# Patient Record
Sex: FEMALE | Race: WHITE | HISPANIC OR LATINO | Employment: STUDENT | URBAN - METROPOLITAN AREA
[De-identification: names, ages, dates, MRNs, and addresses within clinical notes are randomized per-mention and may not be internally consistent; named-entity substitution may affect disease eponyms.]

---
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
## 2024-02-22 ENCOUNTER — OFFICE VISIT (OUTPATIENT)
Dept: URGENT CARE | Facility: CLINIC | Age: 19
End: 2024-02-22
Payer: COMMERCIAL

## 2024-02-22 ENCOUNTER — APPOINTMENT (OUTPATIENT)
Dept: LAB | Facility: CLINIC | Age: 19
End: 2024-02-22
Payer: COMMERCIAL

## 2024-02-22 ENCOUNTER — APPOINTMENT (OUTPATIENT)
Dept: URGENT CARE | Facility: CLINIC | Age: 19
End: 2024-02-22
Payer: COMMERCIAL

## 2024-02-22 VITALS
RESPIRATION RATE: 18 BRPM | TEMPERATURE: 98.1 F | OXYGEN SATURATION: 95 % | DIASTOLIC BLOOD PRESSURE: 85 MMHG | SYSTOLIC BLOOD PRESSURE: 127 MMHG | HEART RATE: 96 BPM

## 2024-02-22 DIAGNOSIS — R79.89 ABNORMAL LFTS: ICD-10-CM

## 2024-02-22 DIAGNOSIS — J02.9 SORE THROAT: Primary | ICD-10-CM

## 2024-02-22 DIAGNOSIS — J20.9 ACUTE BRONCHITIS, UNSPECIFIED ORGANISM: ICD-10-CM

## 2024-02-22 LAB
ALBUMIN SERPL BCP-MCNC: 4.4 G/DL (ref 3.5–5)
ALP SERPL-CCNC: 61 U/L (ref 34–104)
ALT SERPL W P-5'-P-CCNC: 28 U/L (ref 7–52)
ANION GAP SERPL CALCULATED.3IONS-SCNC: 8 MMOL/L
AST SERPL W P-5'-P-CCNC: 15 U/L (ref 13–39)
BILIRUB SERPL-MCNC: 1.05 MG/DL (ref 0.2–1)
BUN SERPL-MCNC: 9 MG/DL (ref 5–25)
CALCIUM SERPL-MCNC: 9.2 MG/DL (ref 8.4–10.2)
CHLORIDE SERPL-SCNC: 105 MMOL/L (ref 96–108)
CO2 SERPL-SCNC: 27 MMOL/L (ref 21–32)
CREAT SERPL-MCNC: 0.56 MG/DL (ref 0.6–1.3)
GFR SERPL CREATININE-BSD FRML MDRD: 134 ML/MIN/1.73SQ M
GLUCOSE P FAST SERPL-MCNC: 100 MG/DL (ref 65–99)
POTASSIUM SERPL-SCNC: 3.9 MMOL/L (ref 3.5–5.3)
PROT SERPL-MCNC: 6.5 G/DL (ref 6.4–8.4)
S PYO AG THROAT QL: NEGATIVE
SODIUM SERPL-SCNC: 140 MMOL/L (ref 135–147)

## 2024-02-22 PROCEDURE — 80053 COMPREHEN METABOLIC PANEL: CPT

## 2024-02-22 PROCEDURE — 99213 OFFICE O/P EST LOW 20 MIN: CPT

## 2024-02-22 PROCEDURE — 87880 STREP A ASSAY W/OPTIC: CPT

## 2024-02-22 PROCEDURE — 36415 COLL VENOUS BLD VENIPUNCTURE: CPT

## 2024-02-22 RX ORDER — AZITHROMYCIN 250 MG/1
TABLET, FILM COATED ORAL
Qty: 6 TABLET | Refills: 0 | Status: SHIPPED | OUTPATIENT
Start: 2024-02-22 | End: 2024-02-26

## 2024-02-22 NOTE — PROGRESS NOTES
[facility]        NAME: Samantha Cruz is a 19 y.o. female  : 2005    MRN: [id number]  DATE: 2024  TIME: 10:35 AM    Assessment and Plan   Sore throat [J02.9]  1. Sore throat  POCT rapid ANTIGEN strepA      2. Acute bronchitis, unspecified organism  azithromycin (ZITHROMAX) 250 mg tablet        Rapid strep negative.  Work note given.     Patient Instructions     Start azithromycin.  Mucinex over-the-counter for cough and congestion.  Tylenol and Motrin over-the-counter for pain and fever.    Follow up with PCP.  Go to the nearest emergency department if you have difficulty breathing, worsening symptoms.     Chief Complaint     Chief Complaint   Patient presents with    Sore Throat     Sore throat, runny nose, nausea, and hot flashes. Started 7 days ago. Taking otc medication w/o relief.          History of Present Illness       The patient presents today with complaints of nasal congestion, body aches, nausea, productive cough, sore throat x 7 days. She has been taking OTC dayquil/nyquil with minimal relief.         Review of Systems   Review of Systems   Constitutional:  Positive for diaphoresis. Negative for chills and fever.   HENT:  Positive for congestion, postnasal drip, rhinorrhea and sore throat. Negative for ear pain, sinus pressure and sinus pain.    Respiratory:  Positive for cough. Negative for shortness of breath and wheezing.    Gastrointestinal:  Positive for nausea. Negative for abdominal pain, diarrhea and vomiting.   Musculoskeletal:  Negative for myalgias.   Skin:  Negative for rash.         Current Medications       Current Outpatient Medications:     azithromycin (ZITHROMAX) 250 mg tablet, Take 2 tablets today then 1 tablet daily x 4 days, Disp: 6 tablet, Rfl: 0    norethindrone-ethinyl estradiol (Loestrin Fe ) 1-20 MG-MCG per tablet, Take 1 tablet by mouth daily, Disp: 30 tablet, Rfl: 0    Current Allergies     Allergies as of 2024    (No Known  Allergies)            The following portions of the patient's history were reviewed and updated as appropriate: allergies, current medications, past family history, past medical history, past social history, past surgical history and problem list.     Past Medical History:   Diagnosis Date    Acne        Past Surgical History:   Procedure Laterality Date    KNEE SURGERY Left        Family History   Problem Relation Age of Onset    Ulcerative colitis Mother     Hypertension Father     Heart failure Father          Medications have been verified.        Objective   /85   Pulse 96   Temp 98.1 °F (36.7 °C)   Resp 18   SpO2 95%        Physical Exam     Physical Exam  Vitals and nursing note reviewed.   Constitutional:       General: She is not in acute distress.     Appearance: Normal appearance. She is not ill-appearing.   HENT:      Head: Normocephalic and atraumatic.      Right Ear: Tympanic membrane, ear canal and external ear normal.      Left Ear: Tympanic membrane, ear canal and external ear normal.      Nose: Congestion present. No rhinorrhea.      Mouth/Throat:      Lips: Pink.      Mouth: Mucous membranes are moist.      Pharynx: Oropharynx is clear. Uvula midline. No pharyngeal swelling, oropharyngeal exudate, posterior oropharyngeal erythema or uvula swelling.      Tonsils: No tonsillar exudate.   Eyes:      General: Vision grossly intact.      Extraocular Movements: Extraocular movements intact.      Pupils: Pupils are equal, round, and reactive to light.   Cardiovascular:      Rate and Rhythm: Normal rate and regular rhythm.      Heart sounds: Normal heart sounds. No murmur heard.  Pulmonary:      Effort: Pulmonary effort is normal. No respiratory distress.      Breath sounds: Normal breath sounds. No decreased air movement. No decreased breath sounds, wheezing, rhonchi or rales.   Musculoskeletal:         General: Normal range of motion.      Cervical back: Normal range of motion.    Lymphadenopathy:      Cervical: No cervical adenopathy.   Skin:     General: Skin is warm.      Findings: No rash.   Neurological:      Mental Status: She is alert and oriented to person, place, and time.      Motor: Motor function is intact.      Gait: Gait is intact.   Psychiatric:         Attention and Perception: Attention normal.         Mood and Affect: Mood normal.

## 2024-02-22 NOTE — PATIENT INSTRUCTIONS
Start azithromycin.  Mucinex over-the-counter for cough and congestion.  Tylenol and Motrin over-the-counter for pain and fever.    Follow up with PCP.  Go to the nearest emergency department if you have difficulty breathing, worsening symptoms.         Acute Bronchitis   WHAT YOU NEED TO KNOW:   Acute bronchitis is swelling and irritation in your lungs. It is usually caused by a virus and most often happens in the winter. Bronchitis may also be caused by bacteria or by a chemical irritant, such as smoke.  DISCHARGE INSTRUCTIONS:   Return to the emergency department if:   You cough up blood.     Your lips or fingernails turn blue.     You feel like you are not getting enough air when you breathe.     Call your doctor if:   Your symptoms do not go away or get worse, even after treatment.     Your cough does not get better within 4 weeks.     You have questions or concerns about your condition or care.     Medicines:  You may  need any of the following:  Cough suppressants  decrease your urge to cough.      Decongestants  help loosen mucus in your lungs and make it easier to cough up. This can help you breathe easier.     Inhalers  may be given. Your healthcare provider may give you one or more inhalers to help you breathe easier and cough less. An inhaler gives your medicine to open your airways. Ask your healthcare provider to show you how to use your inhaler correctly.          Antibiotics  may be given for up to 5 days if your bronchitis is caused by bacteria.     Acetaminophen  decreases pain and fever. It is available without a doctor's order. Ask how much to take and how often to take it. Follow directions. Read the labels of all other medicines you are using to see if they also contain acetaminophen, or ask your doctor or pharmacist. Acetaminophen can cause liver damage if not taken correctly. Do not use more than 4 grams (4,000 milligrams) total of acetaminophen in one day.      NSAIDs  help decrease swelling  and pain or fever. This medicine is available with or without a doctor's order. NSAIDs can cause stomach bleeding or kidney problems in certain people. If you take blood thinner medicine, always ask your healthcare provider if NSAIDs are safe for you. Always read the medicine label and follow directions.     Take your medicine as directed.  Contact your healthcare provider if you think your medicine is not helping or if you have side effects. Tell him of her if you are allergic to any medicine. Keep a list of the medicines, vitamins, and herbs you take. Include the amounts, and when and why you take them. Bring the list or the pill bottles to follow-up visits. Carry your medicine list with you in case of an emergency.     Self-care:   Drink liquids as directed.  You may need to drink more liquids than usual to stay hydrated. Ask how much liquid to drink each day and which liquids are best for you.     Use a cool mist humidifier  to increase air moisture in your home. This may make it easier for you to breathe and help decrease your cough.      Get more rest.  Rest helps your body to heal. Slowly start to do more each day. Rest when you feel it is needed.     Avoid irritants in the air.  Avoid chemicals, fumes, and dust. Wear a face mask if you must work around dust or fumes. Stay inside on days when air pollution levels are high. If you have allergies, stay inside when pollen counts are high. Do not use aerosol products, such as spray-on deodorant, bug spray, and hair spray.     Do not smoke or be around others who are smoking.  Nicotine and other chemicals in cigarettes and cigars can cause lung damage. Ask your healthcare provider for information if you currently smoke and need help to quit. E-cigarettes or smokeless tobacco still contain nicotine. Talk to your healthcare provider before you use these products.     Prevent acute bronchitis:       Ask about vaccines you may need.  Get a flu vaccine each year as  soon as recommended, usually in September or October. Ask your healthcare provider if you should also get a pneumonia or COVID-19 vaccine. Your healthcare provider can tell you if you should also get other vaccines, and when to get them.     Prevent the spread of germs.  You can decrease your risk for acute bronchitis and other illnesses by doing the following:      Wash your hands often with soap and water. Carry germ-killing hand lotion or gel with you. You can use the lotion or gel to clean your hands when soap and water are not available.          Do not touch your eyes, nose, or mouth unless you have washed your hands first.     Always cover your mouth when you cough to prevent the spread of germs. It is best to cough into a tissue or your shirt sleeve instead of into your hand. Ask those around you to cover their mouths when they cough.     Try to avoid people who have a cold or the flu. If you are sick, stay away from others as much as possible.     Follow up with your doctor as directed:  Write down questions you have so you will remember to ask them during your follow-up visits.  © Copyright [employer] 2021 Information is for End User's use only and may not be sold, redistributed or otherwise used for commercial purposes. All illustrations and images included in CareNotes® are the copyrighted property of [employer] or [employer]  The above information is an  only. It is not intended as medical advice for individual conditions or treatments. Talk to your doctor, nurse or pharmacist before following any medical regimen to see if it is safe and effective for you.

## 2024-02-22 NOTE — LETTER
February 22, 2024     Patient: Samantha Cruz   YOB: 2005   Date of Visit: 2/22/2024       To Whom it May Concern:    Samantha Cruz was seen in my clinic on 2/22/2024. She may return to work on 2/26/2024 .    If you have any questions or concerns, please don't hesitate to call.         Sincerely,          LARISSA Javier        CC: No Recipients

## 2024-05-26 ENCOUNTER — LAB REQUISITION (OUTPATIENT)
Dept: LAB | Facility: HOSPITAL | Age: 19
End: 2024-05-26

## 2024-05-26 DIAGNOSIS — Z02.83 ENCOUNTER FOR BLOOD-ALCOHOL AND BLOOD-DRUG TEST: ICD-10-CM

## 2024-06-13 ENCOUNTER — APPOINTMENT (OUTPATIENT)
Dept: RADIOLOGY | Facility: CLINIC | Age: 19
End: 2024-06-13
Payer: COMMERCIAL

## 2024-06-13 ENCOUNTER — OFFICE VISIT (OUTPATIENT)
Dept: FAMILY MEDICINE CLINIC | Facility: CLINIC | Age: 19
End: 2024-06-13
Payer: COMMERCIAL

## 2024-06-13 VITALS
SYSTOLIC BLOOD PRESSURE: 108 MMHG | HEIGHT: 65 IN | TEMPERATURE: 98.5 F | DIASTOLIC BLOOD PRESSURE: 74 MMHG | WEIGHT: 209 LBS | BODY MASS INDEX: 34.82 KG/M2 | OXYGEN SATURATION: 98 % | HEART RATE: 80 BPM

## 2024-06-13 DIAGNOSIS — M54.50 LUMBAR PAIN: ICD-10-CM

## 2024-06-13 DIAGNOSIS — M25.562 ACUTE PAIN OF LEFT KNEE: ICD-10-CM

## 2024-06-13 DIAGNOSIS — Z23 ENCOUNTER FOR IMMUNIZATION: ICD-10-CM

## 2024-06-13 DIAGNOSIS — E61.1 IRON DEFICIENCY: ICD-10-CM

## 2024-06-13 DIAGNOSIS — R10.9 LEFT FLANK PAIN: ICD-10-CM

## 2024-06-13 DIAGNOSIS — Z13.220 LIPID SCREENING: ICD-10-CM

## 2024-06-13 DIAGNOSIS — M25.562 ACUTE PAIN OF LEFT KNEE: Primary | ICD-10-CM

## 2024-06-13 DIAGNOSIS — Z00.00 ROUTINE PHYSICAL EXAMINATION: ICD-10-CM

## 2024-06-13 DIAGNOSIS — E55.9 VITAMIN D DEFICIENCY: ICD-10-CM

## 2024-06-13 DIAGNOSIS — Z13.29 SCREENING FOR THYROID DISORDER: ICD-10-CM

## 2024-06-13 PROCEDURE — 90471 IMMUNIZATION ADMIN: CPT

## 2024-06-13 PROCEDURE — 72110 X-RAY EXAM L-2 SPINE 4/>VWS: CPT

## 2024-06-13 PROCEDURE — 90715 TDAP VACCINE 7 YRS/> IM: CPT

## 2024-06-13 PROCEDURE — 99213 OFFICE O/P EST LOW 20 MIN: CPT

## 2024-06-13 PROCEDURE — 73562 X-RAY EXAM OF KNEE 3: CPT

## 2024-06-13 PROCEDURE — 99395 PREV VISIT EST AGE 18-39: CPT

## 2024-06-13 NOTE — PROGRESS NOTES
Adult Annual Physical  Name: Samantha Cruz      : 2005      MRN: [id number]  Encounter Provider: Rochelle Hines PA-C  Encounter Date: 2024   Encounter department: [facility]    Assessment & Plan   1. Acute pain of left knee  Assessment & Plan:  - injury to left knee when walking a few days ago  - exam unremarkable; no pain to palpation, no swelling and full ROM  - ordered XR left knee for further evaluation   - advised supportive care and referral to ortho placed given her hx   Orders:  -     XR knee 3 vw left non injury; Future; Expected date: 2024  -     Ambulatory Referral to Orthopedic Surgery; Future  2. Lumbar pain  Assessment & Plan:  - reports left flank pain radiating to lower back  - ordered XR lumbar spine for further evaluation   - encouraged supportive care   Orders:  -     XR spine lumbar minimum 4 views non injury; Future; Expected date: 2024  3. Left flank pain  Assessment & Plan:  - left flank pain x 2-3 weeks intermittently; no urinary symptoms  - unable to provide urine sample today therefore ordered to be completed at the lab tomorrow  - exam unremarkable; negative for CVA tenderness and pain not reproducible   - advised ER if symptoms worsen   Orders:  -     UA w Reflex to Microscopic w Reflex to Culture; Future  4. Lipid screening  -     Lipid panel; Future  5. Routine physical examination  Assessment & Plan:  - physical exam unremarkable; BP WNL  - ordered routine labs  - updated TDAP  Orders:  -     Comprehensive metabolic panel; Future  6. Iron deficiency  -     Iron Panel (Includes Ferritin, Iron Sat%, Iron, and TIBC); Future  -     CBC and differential; Future  7. Vitamin D deficiency  -     Vitamin D 25 hydroxy; Future  8. Screening for thyroid disorder  -     TSH, 3rd generation with Free T4 reflex; Future  9. Encounter for immunization  -     TDAP VACCINE GREATER THAN OR EQUAL TO 6YO IM    Immunizations and preventive care screenings were  "discussed with patient today. Appropriate education was printed on patient's after visit summary.    Counseling:  Exercise: the importance of regular exercise/physical activity was discussed. Recommend exercise 3-5 times per week for at least 30 minutes.        History of Present Illness       Patient reports a few days ago she was walking and feels like she moved her left knee wrong; ever since has had intermittent pain in the left knee. Patient has chronic issues with this knee and has had surgery on it before so just wants to make sure it is okay. Has not noticed any swelling.   Patient also reports left lower back pain that comes on intermittently and feels \"sharp\". Has been going on for about 2-3 weeks. Does not think she'd be able to give a urine sample today. No urinary symptoms.     Adult Annual Physical:  Patient presents for annual physical.     Diet and Physical Activity:  - Diet/Nutrition: well balanced diet.  - Exercise: no formal exercise.    Depression Screening:  - PHQ-2 Score: 0    General Health:  - Sleep: sleeps well.  - Hearing: normal hearing bilateral ears.  - Vision: no vision problems.  - Dental: regular dental visits and brushes teeth twice daily.    /GYN Health:  - Follows with GYN: yes.   - Menopause: premenopausal.   - Contraception: injectable contraception.      Review of Systems   Constitutional:  Negative for chills, diaphoresis and fever.   Eyes:  Negative for visual disturbance.   Respiratory:  Negative for cough, chest tightness, shortness of breath and wheezing.    Cardiovascular:  Negative for chest pain, palpitations and leg swelling.   Gastrointestinal:  Negative for abdominal pain, blood in stool, constipation, diarrhea, nausea and vomiting.   Genitourinary:  Positive for flank pain (left). Negative for difficulty urinating, dysuria, frequency, hematuria and urgency.   Musculoskeletal:  Positive for arthralgias (left knee) and back pain (lower back). Negative for gait " "problem and joint swelling.   Neurological:  Negative for dizziness, light-headedness and headaches.   Psychiatric/Behavioral:  Negative for sleep disturbance.      Medical History Reviewed by provider this encounter:       Past Medical History   Past Medical History:   Diagnosis Date    Acne      Past Surgical History:   Procedure Laterality Date    KNEE SURGERY Left      Family History   Problem Relation Age of Onset    Ulcerative colitis Mother     Hypertension Father     Heart failure Father      Current Outpatient Medications on File Prior to Visit   Medication Sig Dispense Refill    Etonogestrel (NEXPLANON SC) Inject under the skin      [DISCONTINUED] norethindrone-ethinyl estradiol (Loestrin Fe 1/20) 1-20 MG-MCG per tablet Take 1 tablet by mouth daily 30 tablet 0     No current facility-administered medications on file prior to visit.   No Known Allergies   Current Outpatient Medications on File Prior to Visit   Medication Sig Dispense Refill    Etonogestrel (NEXPLANON SC) Inject under the skin      [DISCONTINUED] norethindrone-ethinyl estradiol (Loestrin Fe 1/20) 1-20 MG-MCG per tablet Take 1 tablet by mouth daily 30 tablet 0     No current facility-administered medications on file prior to visit.      Social History     Tobacco Use    Smoking status: Never    Smokeless tobacco: Never   Vaping Use    Vaping status: Never Used   Substance and Sexual Activity    Alcohol use: Yes    Drug use: Never    Sexual activity: Never     Objective     /74   Pulse 80   Temp 98.5 °F (36.9 °C)   Ht 5' 5\" (1.651 m)   Wt 94.8 kg (209 lb)   SpO2 98%   BMI 34.78 kg/m²     Physical Exam  Vitals reviewed.   Constitutional:       General: She is not in acute distress.     Appearance: Normal appearance. She is not ill-appearing or diaphoretic.   HENT:      Head: Normocephalic and atraumatic.      Right Ear: Tympanic membrane, ear canal and external ear normal. There is no impacted cerumen.      Left Ear: Tympanic " membrane, ear canal and external ear normal. There is no impacted cerumen.      Nose: Nose normal. No congestion or rhinorrhea.      Mouth/Throat:      Mouth: Mucous membranes are moist.      Pharynx: Oropharynx is clear. No oropharyngeal exudate or posterior oropharyngeal erythema.   Eyes:      General:         Right eye: No discharge.         Left eye: No discharge.      Conjunctiva/sclera: Conjunctivae normal.   Cardiovascular:      Rate and Rhythm: Normal rate and regular rhythm.      Pulses: Normal pulses.      Heart sounds: Normal heart sounds. No murmur heard.  Pulmonary:      Effort: Pulmonary effort is normal. No respiratory distress.      Breath sounds: Normal breath sounds. No wheezing, rhonchi or rales.   Abdominal:      General: Bowel sounds are normal. There is no distension.      Palpations: Abdomen is soft. There is no mass.      Tenderness: There is no abdominal tenderness. There is no right CVA tenderness, left CVA tenderness, guarding or rebound.      Hernia: No hernia is present.   Musculoskeletal:         General: Normal range of motion.      Cervical back: Normal range of motion and neck supple.      Left knee: Normal. No swelling or bony tenderness. Normal range of motion. No tenderness.      Right lower leg: No edema.      Left lower leg: No edema.   Lymphadenopathy:      Cervical: No cervical adenopathy.   Skin:     General: Skin is warm.   Neurological:      General: No focal deficit present.      Mental Status: She is alert.      Gait: Gait normal.   Psychiatric:         Mood and Affect: Mood normal.       Administrative Statements

## 2024-06-13 NOTE — ASSESSMENT & PLAN NOTE
- reports left flank pain radiating to lower back  - ordered XR lumbar spine for further evaluation   - encouraged supportive care

## 2024-06-13 NOTE — ASSESSMENT & PLAN NOTE
- injury to left knee when walking a few days ago  - exam unremarkable; no pain to palpation, no swelling and full ROM  - ordered XR left knee for further evaluation   - advised supportive care and referral to ortho placed given her hx

## 2024-06-13 NOTE — ASSESSMENT & PLAN NOTE
- left flank pain x 2-3 weeks intermittently; no urinary symptoms  - unable to provide urine sample today therefore ordered to be completed at the lab tomorrow  - exam unremarkable; negative for CVA tenderness and pain not reproducible   - advised ER if symptoms worsen

## 2024-06-14 ENCOUNTER — TELEPHONE (OUTPATIENT)
Dept: FAMILY MEDICINE CLINIC | Facility: CLINIC | Age: 19
End: 2024-06-14

## 2024-06-14 NOTE — TELEPHONE ENCOUNTER
Rochelle Hines PA-C  6/14/2024  7:37 AM EDT Back to Top      XR lower back normal   XR left knee normal. Follow with ortho for further eval; referral placed yesterday

## 2024-06-17 NOTE — TELEPHONE ENCOUNTER
Patient returning call regarding results.  Warm transfer to [facility].

## 2024-06-20 LAB
25(OH)D3+25(OH)D2 SERPL-MCNC: 22 NG/ML (ref 30–100)
ALBUMIN SERPL-MCNC: 4.4 G/DL (ref 3.5–5.7)
ALP SERPL-CCNC: 71 U/L (ref 35–120)
ALT SERPL-CCNC: 15 U/L
ANION GAP SERPL CALCULATED.3IONS-SCNC: 9 MMOL/L (ref 3–11)
AST SERPL-CCNC: 14 U/L
BACTERIA URNS QL MICRO: ABNORMAL
BASOPHILS # BLD AUTO: 0.1 THOU/CMM (ref 0–0.1)
BASOPHILS NFR BLD AUTO: 1 %
BILIRUB SERPL-MCNC: 0.9 MG/DL (ref 0.2–1)
BUN SERPL-MCNC: 8 MG/DL (ref 7–25)
CALCIUM SERPL-MCNC: 9.2 MG/DL (ref 8.5–10.1)
CHLORIDE SERPL-SCNC: 105 MMOL/L (ref 100–109)
CHOLEST SERPL-MCNC: 170 MG/DL
CHOLEST/HDLC SERPL: 5.5 {RATIO}
CO2 SERPL-SCNC: 25 MMOL/L (ref 21–31)
CREAT SERPL-MCNC: 0.65 MG/DL (ref 0.4–1.1)
CYTOLOGY CMNT CVX/VAG CYTO-IMP: NORMAL
DIFFERENTIAL METHOD BLD: ABNORMAL
EOSINOPHIL # BLD AUTO: 0.2 THOU/CMM (ref 0–0.5)
EOSINOPHIL NFR BLD AUTO: 4 %
ERYTHROCYTE [DISTWIDTH] IN BLOOD BY AUTOMATED COUNT: 12.8 % (ref 12–16)
FERRITIN SERPL-MCNC: 23 NG/ML (ref 11–306.8)
GFR/BSA.PRED SERPLBLD CYS-BASED-ARV: 129 ML/MIN/{1.73_M2}
GLUCOSE SERPL-MCNC: 86 MG/DL (ref 65–99)
GLUCOSE UR QL STRIP: NEGATIVE MG/DL
HCT VFR BLD AUTO: 42.8 % (ref 35–43)
HDLC SERPL-MCNC: 31 MG/DL (ref 23–92)
HGB BLD-MCNC: 14.6 G/DL (ref 11.5–14.5)
HGB UR QL STRIP: ABNORMAL MG/DL
IRON SATN MFR SERPL: 12 % (ref 20–50)
IRON SERPL-MCNC: 54 UG/DL (ref 50–212)
KETONES UR QL STRIP: NEGATIVE MG/DL
LDLC SERPL CALC-MCNC: 120 MG/DL
LEUKOCYTE ESTERASE UR QL STRIP: ABNORMAL /UL
LYMPHOCYTES # BLD AUTO: 2.9 THOU/CMM (ref 1–3)
LYMPHOCYTES NFR BLD AUTO: 44 %
MCH RBC QN AUTO: 30.1 PG (ref 26–34)
MCHC RBC AUTO-ENTMCNC: 34.1 G/DL (ref 32–37)
MCV RBC AUTO: 88 FL (ref 80–100)
MONOCYTES # BLD AUTO: 0.5 THOU/CMM (ref 0.3–1)
MONOCYTES NFR BLD AUTO: 7 %
MUCOUS THREADS URNS QL MICRO: ABNORMAL
NEUTROPHILS # BLD AUTO: 2.9 THOU/CMM (ref 1.8–7.8)
NEUTROPHILS NFR BLD AUTO: 44 %
NITRITE UR QL STRIP: NEGATIVE
NONHDLC SERPL-MCNC: 139 MG/DL
PH UR: 6 [PH] (ref 4.5–8)
PLATELET # BLD AUTO: 228 THOU/CMM (ref 140–350)
PMV BLD REES-ECKER: 8.5 FL (ref 7.5–11.3)
POTASSIUM SERPL-SCNC: 4.1 MMOL/L (ref 3.5–5.2)
PROT 24H UR-MRATE: NEGATIVE MG/DL
PROT SERPL-MCNC: 6.9 G/DL (ref 6.3–8.3)
RBC # BLD AUTO: 4.84 MILL/CMM (ref 3.7–4.7)
RBC #/AREA URNS HPF: ABNORMAL /HPF (ref 0–2)
SL AMB POCT URINE COMMENT: ABNORMAL
SODIUM SERPL-SCNC: 139 MMOL/L (ref 135–145)
SP GR UR: 1.02 (ref 1–1.03)
SQUAMOUS #/AREA URNS HPF: >10 /LPF (ref 0–5)
TIBC SERPL-MCNC: 445 UG/DL (ref 260–430)
TRANSFERRIN SERPL-MCNC: 318 MG/DL (ref 203–362)
TRIGL SERPL-MCNC: 95 MG/DL
TSH SERPL-ACNC: 0.83 UIU/ML (ref 0.45–5.33)
WBC # BLD AUTO: 6.6 THOU/CMM (ref 4–10)
WBC #/AREA URNS HPF: ABNORMAL /HPF (ref 0–5)

## 2024-06-21 ENCOUNTER — TELEPHONE (OUTPATIENT)
Dept: FAMILY MEDICINE CLINIC | Facility: CLINIC | Age: 19
End: 2024-06-21

## 2024-06-21 DIAGNOSIS — E61.1 IRON DEFICIENCY: Primary | ICD-10-CM

## 2024-06-21 LAB — LEGIONELLA SPEC CULT: NORMAL

## 2024-06-21 RX ORDER — FERROUS SULFATE 324(65)MG
324 TABLET, DELAYED RELEASE (ENTERIC COATED) ORAL
Qty: 30 TABLET | Refills: 0 | Status: SHIPPED | OUTPATIENT
Start: 2024-06-21

## 2024-06-21 NOTE — TELEPHONE ENCOUNTER
----- Message from Rochelle Hines PA-C sent at 6/21/2024  9:24 AM EDT -----  Her urine did show bacteria in it; is she still having the flank pain/any urinary symptoms? Would recommend antibiotic, keflex if so. Can send in.

## 2024-06-21 NOTE — TELEPHONE ENCOUNTER
Pt is aware of message from 6/21/24 regarding results of labs and urine her symptoms are off and on pain on her left thigh sometimes on the right side doesn't want to be put on antibiotic.pt agree to take vitamin D otc and for ferrous sulfate to be sent to pharmacy.

## 2024-06-21 NOTE — TELEPHONE ENCOUNTER
----- Message from Rochelle Hines PA-C sent at 6/21/2024  9:26 AM EDT -----  Vitamin D low - recommend taking 2000 units daily OTC then will recheck in 12 weeks.   Iron panel shows her iron to be a little low; does she take iron supplement? If not, would recommend. Can send in ferrous sulfate.   Hgb and RBC a little high; could be related to the iron def. WBC are normal.   TSH normal.   Total cholesterol and triglycerides are normal.   Kidney and liver function normal. Sugar normal.

## 2024-06-21 NOTE — TELEPHONE ENCOUNTER
Sent iron - take in morning with breakfast. May cause upset stomach, constipation, diarrhea, discoloration of stool.

## 2024-06-24 ENCOUNTER — TELEPHONE (OUTPATIENT)
Dept: FAMILY MEDICINE CLINIC | Facility: CLINIC | Age: 19
End: 2024-06-24

## 2024-06-24 DIAGNOSIS — E55.9 VITAMIN D DEFICIENCY: Primary | ICD-10-CM

## 2024-06-24 RX ORDER — ERGOCALCIFEROL 1.25 MG/1
50000 CAPSULE ORAL WEEKLY
Qty: 12 CAPSULE | Refills: 0 | Status: SHIPPED | OUTPATIENT
Start: 2024-06-24

## 2024-06-24 NOTE — TELEPHONE ENCOUNTER
Mom called and Samantha isn't taking the calcium regularly. Can you RX Vitamin D 50,000 units one a week instead? To [facility] in [place].

## 2024-07-10 ENCOUNTER — OFFICE VISIT (OUTPATIENT)
Dept: OBGYN CLINIC | Facility: CLINIC | Age: 19
End: 2024-07-10
Payer: COMMERCIAL

## 2024-07-10 VITALS
BODY MASS INDEX: 33.11 KG/M2 | WEIGHT: 206 LBS | HEIGHT: 66 IN | HEART RATE: 84 BPM | DIASTOLIC BLOOD PRESSURE: 78 MMHG | SYSTOLIC BLOOD PRESSURE: 113 MMHG

## 2024-07-10 DIAGNOSIS — R29.898 WEAKNESS OF BOTH HIPS: ICD-10-CM

## 2024-07-10 DIAGNOSIS — M62.9 HAMSTRING TIGHTNESS OF BOTH LOWER EXTREMITIES: ICD-10-CM

## 2024-07-10 DIAGNOSIS — M22.2X2 PATELLOFEMORAL SYNDROME OF LEFT KNEE: Primary | ICD-10-CM

## 2024-07-10 PROCEDURE — 99244 OFF/OP CNSLTJ NEW/EST MOD 40: CPT | Performed by: FAMILY MEDICINE

## 2024-07-10 RX ORDER — NAPROXEN 500 MG/1
500 TABLET ORAL 2 TIMES DAILY WITH MEALS
Qty: 30 TABLET | Refills: 0 | Status: SHIPPED | OUTPATIENT
Start: 2024-07-10

## 2024-07-10 NOTE — PROGRESS NOTES
Assessment/Plan:  Assessment & Plan   Diagnoses and all orders for this visit:    Patellofemoral syndrome of left knee  -     Ambulatory Referral to Orthopedic Surgery  -     Brace  -     naproxen (Naprosyn) 500 mg tablet; Take 1 tablet (500 mg total) by mouth 2 (two) times a day with meals  -     Ambulatory Referral to Physical Therapy; Future    Weakness of both hips  -     Ambulatory Referral to Physical Therapy; Future    Hamstring tightness of both lower extremities  -     Ambulatory Referral to Physical Therapy; Future      19-year-old female with left knee pain and instability more than several years duration.  Discussed the patient physical exam, radiographs, impression, and plan.  X-rays of the left knee are unremarkable for acute osseous abnormality.  Imaging studies, physical exam, symptomology are consistent with patellofemoral syndrome.  I discussed treatment regimen of anti-inflammatory, supplements, bracing, and formal therapy.  Invasive management such as surgery not warranted at this time.  She is to take naproxen 500 mg twice daily with food for 2 weeks.  She is to take turmeric, tart cherry, and glucosamine supplements.  I provided patellar stabilizing brace to wear during physical activity.  I will refer her to formal therapy which she is start a soon as possible and do home exercises as directed.  Recommend she visit athletic shoe store to be advised on supportive footwear.  She will follow-up as needed.      Subjective:   Patient ID: Samantha Cruz is a 19 y.o. female.  Chief Complaint   Patient presents with    Left Knee - Pain        19-year-old female presents evaluation left knee pain and instability more than several years duration.  She reports have had injury to the left knee when she was much younger which she states warranting arthroscopic surgery for cartilage defect repair.  Since then she has been increasing pain described as generalized and knee but worse to the anterior aspect,  "achy and sore and burning, worse with bending and squatting, worse with kneeling, associate with clicking/popping and symptom instability of the knee, and improved with resting.  She does not usually take medication for symptoms.  She works as a  and spends a lot of time bending and squatting which aggravates her pain.  She was seen by primary care provider, referred for x-rays, and referred to orthopedic care.    Knee Pain  This is a chronic problem. The current episode started more than 1 year ago. The problem occurs daily. The problem has been gradually worsening. Associated symptoms include arthralgias and joint swelling. Pertinent negatives include no numbness or weakness. The symptoms are aggravated by bending (Kneeling, squatting). She has tried rest and position changes for the symptoms. The treatment provided mild relief.           The following portions of the patient's history were reviewed and updated as appropriate: She  has a past medical history of Acne.  She has No Known Allergies..    Review of Systems   Musculoskeletal:  Positive for arthralgias and joint swelling.   Neurological:  Negative for weakness and numbness.       Objective:  Vitals:    07/10/24 1310   BP: 113/78   Pulse: 84   Weight: 93.4 kg (206 lb)   Height: 5' 6\" (1.676 m)      Left Knee Exam     Muscle Strength   The patient has normal left knee strength.    Tenderness   The patient is experiencing tenderness in the medial joint line (Patella facet).    Range of Motion   Extension:  normal   Flexion:  130     Tests   Luz Marina:  Medial - negative Lateral - negative  Varus: negative Valgus: negative  Lachman:  Anterior - negative        Other   Swelling: none    Comments:  Positive patella inhibition and grind      Right Hip Exam     Muscle Strength   Abduction: 4/5   Flexion: 5/5       Left Hip Exam     Muscle Strength   Abduction: 4/5   Flexion: 4/5     Tests   SYLWIA: negative    Comments:  Negative " FADDIR  Hamstring tightness            Physical Exam  Vitals and nursing note reviewed.   Constitutional:       General: She is not in acute distress.     Appearance: Normal appearance. She is well-developed. She is not ill-appearing or diaphoretic.   HENT:      Head: Normocephalic and atraumatic.      Right Ear: External ear normal.      Left Ear: External ear normal.   Eyes:      Conjunctiva/sclera: Conjunctivae normal.   Neck:      Trachea: No tracheal deviation.   Cardiovascular:      Rate and Rhythm: Normal rate.   Pulmonary:      Effort: Pulmonary effort is normal. No respiratory distress.   Abdominal:      General: There is no distension.   Musculoskeletal:         General: Tenderness present.      Left knee:      Instability Tests: Medial Luz Marina test negative and lateral Luz Marina test negative.   Skin:     General: Skin is warm and dry.      Coloration: Skin is not jaundiced or pale.   Neurological:      Mental Status: She is alert and oriented to person, place, and time.   Psychiatric:         Mood and Affect: Mood normal.         Behavior: Behavior normal.         Thought Content: Thought content normal.         Judgment: Judgment normal.         I have personally reviewed pertinent films in PACS and my interpretation is  .  No acute osseous abnormality left knee.

## 2024-07-10 NOTE — PATIENT INSTRUCTIONS
Over the counter vitamins:    - Turmeric vitamin at least 1000 mg daily    - Tart cherry vitamin at least 1000 mg daily    - Glucosamine-chondrointin 2-3 times daily      Brace during physical activity    [employer] [facility] in [place], [place]    Rx: Naproxen 500 mg  - twice daily  - eat first  - 2 weeks

## 2024-07-10 NOTE — LETTER
July 10, 2024     Rochelle Hines PA-C  [address]   [address] [place] [id number]    Patient: Samantha Cruz   YOB: 2005   Date of Visit: 7/10/2024       Dear Dr. Pepper:    Thank you for referring Samantha Cruz to me for evaluation. Below are my notes for this consultation.    If you have questions, please do not hesitate to call me. I look forward to following your patient along with you.         Sincerely,        Negrito Benitez,         CC: No Recipients    Negrito Benitez DO  7/10/2024  4:28 PM  Sign when Signing Visit  Assessment/Plan:  Assessment & Plan  Diagnoses and all orders for this visit:    Patellofemoral syndrome of left knee  -     Ambulatory Referral to Orthopedic Surgery  -     Brace  -     naproxen (Naprosyn) 500 mg tablet; Take 1 tablet (500 mg total) by mouth 2 (two) times a day with meals  -     Ambulatory Referral to Physical Therapy; Future    Weakness of both hips  -     Ambulatory Referral to Physical Therapy; Future    Hamstring tightness of both lower extremities  -     Ambulatory Referral to Physical Therapy; Future      19-year-old female with left knee pain and instability more than several years duration.  Discussed the patient physical exam, radiographs, impression, and plan.  X-rays of the left knee are unremarkable for acute osseous abnormality.  Imaging studies, physical exam, symptomology are consistent with patellofemoral syndrome.  I discussed treatment regimen of anti-inflammatory, supplements, bracing, and formal therapy.  Invasive management such as surgery not warranted at this time.  She is to take naproxen 500 mg twice daily with food for 2 weeks.  She is to take turmeric, tart cherry, and glucosamine supplements.  I provided patellar stabilizing brace to wear during physical activity.  I will refer her to formal therapy which she is start a soon as possible and do home exercises as directed.  Recommend she visit  "athletic shoe store to be advised on supportive footwear.  She will follow-up as needed.      Subjective:   Patient ID: Samantha Cruz is a 19 y.o. female.  Chief Complaint   Patient presents with   • Left Knee - Pain        19-year-old female presents evaluation left knee pain and instability more than several years duration.  She reports have had injury to the left knee when she was much younger which she states warranting arthroscopic surgery for cartilage defect repair.  Since then she has been increasing pain described as generalized and knee but worse to the anterior aspect, achy and sore and burning, worse with bending and squatting, worse with kneeling, associate with clicking/popping and symptom instability of the knee, and improved with resting.  She does not usually take medication for symptoms.  She works as a  and spends a lot of time bending and squatting which aggravates her pain.  She was seen by primary care provider, referred for x-rays, and referred to orthopedic care.    Knee Pain  This is a chronic problem. The current episode started more than 1 year ago. The problem occurs daily. The problem has been gradually worsening. Associated symptoms include arthralgias and joint swelling. Pertinent negatives include no numbness or weakness. The symptoms are aggravated by bending (Kneeling, squatting). She has tried rest and position changes for the symptoms. The treatment provided mild relief.           The following portions of the patient's history were reviewed and updated as appropriate: She  has a past medical history of Acne.  She has No Known Allergies..    Review of Systems   Musculoskeletal:  Positive for arthralgias and joint swelling.   Neurological:  Negative for weakness and numbness.       Objective:  Vitals:    07/10/24 1310   BP: 113/78   Pulse: 84   Weight: 93.4 kg (206 lb)   Height: 5' 6\" (1.676 m)      Left Knee Exam     Muscle Strength   The patient has normal " left knee strength.    Tenderness   The patient is experiencing tenderness in the medial joint line (Patella facet).    Range of Motion   Extension:  normal   Flexion:  130     Tests   Luz Marina:  Medial - negative Lateral - negative  Varus: negative Valgus: negative  Lachman:  Anterior - negative        Other   Swelling: none    Comments:  Positive patella inhibition and grind      Right Hip Exam     Muscle Strength   Abduction: 4/5   Flexion: 5/5       Left Hip Exam     Muscle Strength   Abduction: 4/5   Flexion: 4/5     Tests   SYLWIA: negative    Comments:  Negative FADDIR  Hamstring tightness            Physical Exam  Vitals and nursing note reviewed.   Constitutional:       General: She is not in acute distress.     Appearance: Normal appearance. She is well-developed. She is not ill-appearing or diaphoretic.   HENT:      Head: Normocephalic and atraumatic.      Right Ear: External ear normal.      Left Ear: External ear normal.   Eyes:      Conjunctiva/sclera: Conjunctivae normal.   Neck:      Trachea: No tracheal deviation.   Cardiovascular:      Rate and Rhythm: Normal rate.   Pulmonary:      Effort: Pulmonary effort is normal. No respiratory distress.   Abdominal:      General: There is no distension.   Musculoskeletal:         General: Tenderness present.      Left knee:      Instability Tests: Medial Luz Marina test negative and lateral Luz Marina test negative.   Skin:     General: Skin is warm and dry.      Coloration: Skin is not jaundiced or pale.   Neurological:      Mental Status: She is alert and oriented to person, place, and time.   Psychiatric:         Mood and Affect: Mood normal.         Behavior: Behavior normal.         Thought Content: Thought content normal.         Judgment: Judgment normal.         I have personally reviewed pertinent films in PACS and my interpretation is  .  No acute osseous abnormality left knee.

## 2024-07-13 PROBLEM — Z00.00 ROUTINE PHYSICAL EXAMINATION: Status: RESOLVED | Noted: 2023-02-20 | Resolved: 2024-07-13

## 2024-07-23 NOTE — PROGRESS NOTES
PT Evaluation     Today's date: 24  Patient name: Samantha Cruz  : 2005  MRN: [id number]  Referring provider: Negrito Benitez*  Dx:   Encounter Diagnosis     ICD-10-CM    1. Patellofemoral syndrome of left knee  M22.2X2 Ambulatory Referral to Physical Therapy      2. Weakness of both hips  R29.898 Ambulatory Referral to Physical Therapy      3. Hamstring tightness of both lower extremities  M62.9 Ambulatory Referral to Physical Therapy          Start Time: 1530  Stop Time: 1630  Total time in clinic (min): 60 minutes     Assessment  Impairments: abnormal coordination, abnormal gait, abnormal muscle firing, abnormal or restricted ROM, abnormal movement, activity intolerance, impaired balance, impaired physical strength, lacks appropriate home exercise program, pain with function, safety issue, weight-bearing intolerance, poor posture  and poor body mechanics    Assessment details: Samantha Cruz is a a pleasant 19 y.o. female who presents today with pain, decreased strength, decreased ROM, and ambulatory dysfunction. The patient's clinical presentation is consistent with her diagnosis of L knee pain. Samantha complains of sharp and throbbing pain in the left knee ranging from 0/10 to 8/10. Pain is exacerbated by activity or standing and made better by heat, medication, or rest.    The patient demonstrates minimally decreased strength during resisted muscle testing. Pt ROM is minimally decreased with no pain.     The patient has difficulty with ambulation, transfers, leisure, athletics, and work. Patient will benefit from skilled physical therapy, including therapeutic exercise, stretching, balance training, manual therapy, and modalities prn to improve their level of function, to increase overall quality of life, and to address her impairments.    Dispensed home exercise program and educated patient on proper technique, frequency, and the possibility of DOMS for the next 24 to 48 hours. Patient  demonstrated understanding and was advised to call us if she has any further questions.  Understanding of Dx/Px/POC: excellent     Prognosis: excellent    Goals  ST. Independent with HEP in 2 weeks.   2. Pt will have verbal report of improvement in symptoms by >/=25% in 2 weeks.   3. Decrease pain to 6/10 at it's worst.   4. Increase strength by 1/2 grade in all deficient planes.     To be achieved by D/C   LT. Pt will improve FOTO score by >/= goal points in 6 weeks.   2. Pt will improve FOTO score to >/= goal score by visit # 12.   3. Pt will be able to stand for an hour with little to no difficulty.   4. Pt will be able to walk a mile with little to no difficulty.   5. Pt will be able to perform her usual activities including bending and lifting with little to no difficulty.     Plan  Patient would benefit from: skilled PT  Planned modality interventions: cryotherapy, TENS and unattended electrical stimulation    Planned therapy interventions: activity modification, ADL retraining, balance, balance/weight bearing training, behavior modification, body mechanics training, functional ROM exercises, gait training, home exercise program, IADL retraining, joint mobilization, manual therapy, massage, neuromuscular re-education, patient education, strengthening, stretching, therapeutic activities, therapeutic exercise, transfer training, kinesiology taping and Fitzgerald taping    Frequency: 2x week  Duration in weeks: 8  Plan of Care beginning date: 2024  Plan of Care expiration date: 2024  Treatment plan discussed with: patient and family        Subjective Evaluation    History of Present Illness  Mechanism of injury: Samantha presents today with complaints of L knee pain that began when she was a child. She gets sharp pains and feels instability. She is wearing a brace on her L knee which has helped. She no longer gets sharp pains, or feel like it's going to give out.    Pt had X-ray performed on   that shows the following: No acute osseous abnormality.    The patient also reports diminished strength, decreased ROM, decreased balance, decreased endurance, and difficulty with function.    Samantha has a past medical history of Acne. The patient also has a past surgical history that includes Knee surgery (Left).  Patient Goals  Patient goals for therapy: return to sport/leisure activities, independence with ADLs/IADLs, increased strength, decreased pain, increased motion and improved balance    Pain  Current pain ratin  At best pain ratin  At worst pain ratin  Quality: dull ache, throbbing and sharp  Relieving factors: ice and support  Aggravating factors: sitting, standing, walking, stair climbing and lifting  Progression: improved (since starting wear brace)          Objective     Tenderness   Left Knee   Tenderness in the patellar tendon.     Neurological Testing     Sensation     Knee   Left Knee   Intact: Light touch    Right Knee   Intact: light touch     Reflexes   Left   Patellar (L4): normal (2+)  Achilles (S1): normal (2+)    Right   Patellar (L4): normal (2+)  Achilles (S1): normal (2+)    Passive Range of Motion   Left Knee   Normal passive range of motion    Right Knee   Normal passive range of motion    Strength/Myotome Testing     Left Knee   Flexion: 4+  Extension: 4+    Right Knee   Normal strength    Tests     Additional Tests Details  All negative special testing at this time.    General Comments:      Ankle/Foot Comments   L dorsal foot bruising from dropping something on it. Denies TTP at this time.         Precautions: Standard    POC expires Unit limit Auth  expiration date PT/OT + Visit Limit?   24 BOMN 24 24                 Visit/Unit Tracking  AUTH Status:  Date               Approved Used 1               Remaining                      Access Code: [email or web address]  URL: [email or web address]  Date: 2024  Prepared by: Gilson Rawls    Manuals              Knee PROM             Patellar Mobs             Knee jt mobs             Spencer tape             Kinesio Tape             Neuro Re-Ed             SL foam catch             Blue foam bal.             StS walk on foam TB             Bosu Balance             SL Clock              Ther Ex             Upright Bike for cardio endurance             Pt Ed. - pathophysiology, HEP, prognosis TS            Bwd. Walk TM             Monster walk             Lateral walk             Lunge w/ lat./med. pull             Side lunges on slider HEP             Matrix HS HEP             Matrix Quad HEP             Resisted walk             TKE HEP             Ther Activity             TG SL             FSU             FSD             LSU             LSD             Gait Training                                       Modalities

## 2024-07-25 ENCOUNTER — EVALUATION (OUTPATIENT)
Dept: PHYSICAL THERAPY | Facility: CLINIC | Age: 19
End: 2024-07-25
Payer: COMMERCIAL

## 2024-07-25 DIAGNOSIS — R29.898 WEAKNESS OF BOTH HIPS: ICD-10-CM

## 2024-07-25 DIAGNOSIS — M22.2X2 PATELLOFEMORAL SYNDROME OF LEFT KNEE: ICD-10-CM

## 2024-07-25 DIAGNOSIS — M62.9 HAMSTRING TIGHTNESS OF BOTH LOWER EXTREMITIES: ICD-10-CM

## 2024-07-25 PROCEDURE — 97161 PT EVAL LOW COMPLEX 20 MIN: CPT

## 2024-07-25 PROCEDURE — 97110 THERAPEUTIC EXERCISES: CPT

## 2024-07-30 DIAGNOSIS — E61.1 IRON DEFICIENCY: ICD-10-CM

## 2024-07-31 RX ORDER — FERROUS SULFATE 324(65)MG
324 TABLET, DELAYED RELEASE (ENTERIC COATED) ORAL
Qty: 30 TABLET | Refills: 5 | Status: SHIPPED | OUTPATIENT
Start: 2024-07-31

## 2024-10-23 ENCOUNTER — TELEPHONE (OUTPATIENT)
Age: 19
End: 2024-10-23

## 2024-10-23 NOTE — TELEPHONE ENCOUNTER
Mother Requested a list of immunization records to be picked up today 10/23 by patient.     Please advise  Thank you

## 2024-10-25 ENCOUNTER — NURSE TRIAGE (OUTPATIENT)
Age: 19
End: 2024-10-25

## 2024-10-25 NOTE — TELEPHONE ENCOUNTER
"Patient called in requesting to schedule her nexplanon removal. States it has been bothering hre for the past few months. Sometimes feels a pain and pinching. States she just feels it in her arm and she does not like that so wants it removed. Attempted to schedule removal however no availability. Called over to the office however no answer. Will send clerical a message for outreach and scheduling.    Reason for Disposition   Wants to get implant removed    Answer Assessment - Initial Assessment Questions  1. IMPLANT TYPE: \"What type of implant are you using?\"  (e.g., Implanon, Nexplanon, Norplant, Jadelle)       Nexplanon  2. IMPLANT START DATE: \"When did you first start using the implant?\"      04/2023  3. IMPLANT LOCATION: \"Where is implant located?\"       L Arm  4. SYMPTOM: \"What is the main symptom (or question) you're concerned about?\"      Bothering, uncomfortable. Feels pinching, feels it in her arm.   5. ONSET: \"When did the symptoms start?\"      Past few months   6. VAGINAL BLEEDING: \"Are you having any unusual vaginal bleeding?\"      N/A  7. PAIN: \"Is there any pain?\" (Scale: 1-10; mild, moderate, severe).      occasional  8. PREGNANCY: \"Are you concerned you might be pregnant?\" \"Are you having any symptoms of pregnancy (breast tenderness, nausea, etc)?\"       Unsure, last week   9.  STD (STI): \"Are you concerned about the possibility of a STD (STI)?\" \"Are you having unprotected sex (sex without a condom)?\"  Denies    Protocols used: Contraception - Implant-Pediatric-[place]    "

## 2024-11-06 ENCOUNTER — TELEPHONE (OUTPATIENT)
Age: 19
End: 2024-11-06

## 2024-11-06 NOTE — TELEPHONE ENCOUNTER
Patient missed appt for today 11/06/2024 for a Nexplanon removal, warm transferred to office, spoke with states Deepika to schedule next available and send telephone encounter to clerical. Patient is scheduled for next available 01/16/2025 with Paty Tran and added to the wait list. Please call patient if she can be seen sooner for Nexplanon removal.

## 2025-01-16 ENCOUNTER — PROCEDURE VISIT (OUTPATIENT)
Dept: OBGYN CLINIC | Facility: MEDICAL CENTER | Age: 20
End: 2025-01-16
Payer: COMMERCIAL

## 2025-01-16 VITALS
WEIGHT: 196 LBS | DIASTOLIC BLOOD PRESSURE: 74 MMHG | BODY MASS INDEX: 31.5 KG/M2 | HEIGHT: 66 IN | SYSTOLIC BLOOD PRESSURE: 124 MMHG

## 2025-01-16 DIAGNOSIS — Z30.46 NEXPLANON REMOVAL: Primary | ICD-10-CM

## 2025-01-16 PROCEDURE — 11982 REMOVE DRUG IMPLANT DEVICE: CPT | Performed by: CLINICAL NURSE SPECIALIST

## 2025-01-16 NOTE — PATIENT INSTRUCTIONS
POST NEXPLANON REMOVAL    You will likely feel mild discomfort at the site as the local anesthesia wears off.  Please keep site wrapped x 24 hours, then keep clean and dry.  Ok to use bandaid if desired.   Steristrips should fall off on their own within 1-2 weeks.  Use ice/motrin/tylenol for any discomfort.  Please notify us if fever, pain or drainage occurs that is concerning to you

## 2025-01-16 NOTE — PROGRESS NOTES
"NEXPLANON REMOVAL    Date of Insertion:  4/4/2023  Date of Removal:  January 16, 2025    Information related to removal of the implant:   Reason(s) for removal:  Other side effects: Pain and pinching  Was implant palpable before removal?  Yes    Procedure:      Universal Protocol:  Procedure performed by:  Consent: Verbal consent obtained.  Risks and benefits: risks, benefits and alternatives were discussed  Consent given by: patient  Time out: Immediately prior to procedure a \"time out\" was called to verify the correct patient, procedure, equipment, support staff and site/side marked as required.  Timeout called at: 1/16/2025 3:06 PM.  Patient understanding: patient states understanding of the procedure being performed  Site marked: the operative site was marked (left arm)  Radiology Images displayed and confirmed. If images not available, report reviewed: imaging studies available  Required items: required blood products, implants, devices, and special equipment available  Patient identity confirmed: verbally with patient  Remove and insert drug implant    Date/Time: 1/16/2025 3:30 PM    Performed by: LARISSA Sutton  Authorized by: LARISSA Sutton    Indication:     Indication: Presence of non-biodegradable drug delivery implant    Pre-procedure:     Pre-procedure timeout performed: yes      Local anesthetic:  Xylocaine with epinephrine    The site was cleaned and prepped in a sterile fashion: yes    Procedure:     Procedure:  Removal    Small stab incision was made in arm: yes (pop out technique used for easy removal)      Left/right:  Left    Site was closed with steri-strips and pressure bandage applied: yes    Comments:       The implant was inspected and found to be intact and complete.  Steri strips and a pressure dressing were applied to the site.  After removal instructions were given and verbally reviewed with the patient who acknowledged her understanding.    Difficulties with the implant " removal procedure?  no

## 2025-05-08 ENCOUNTER — TELEPHONE (OUTPATIENT)
Age: 20
End: 2025-05-08

## 2025-05-08 DIAGNOSIS — Z00.00 HEALTHCARE MAINTENANCE: ICD-10-CM

## 2025-05-08 DIAGNOSIS — Z13.220 LIPID SCREENING: ICD-10-CM

## 2025-05-08 DIAGNOSIS — E61.1 IRON DEFICIENCY: Primary | ICD-10-CM

## 2025-05-08 DIAGNOSIS — E55.9 VITAMIN D DEFICIENCY: ICD-10-CM

## 2025-05-08 NOTE — TELEPHONE ENCOUNTER
Patient has a physical coming up on June 16h w/ Rochelle Hines. Patient would like to know if labs can be ordered to get done before appointment? Please advise. Thank you!     VETOJUANJO (Mother)[phone] (Home Phone)

## 2025-06-16 ENCOUNTER — OFFICE VISIT (OUTPATIENT)
Dept: FAMILY MEDICINE CLINIC | Facility: CLINIC | Age: 20
End: 2025-06-16
Payer: COMMERCIAL

## 2025-06-16 VITALS
HEART RATE: 96 BPM | SYSTOLIC BLOOD PRESSURE: 110 MMHG | WEIGHT: 202 LBS | BODY MASS INDEX: 32.47 KG/M2 | OXYGEN SATURATION: 98 % | DIASTOLIC BLOOD PRESSURE: 70 MMHG | HEIGHT: 66 IN | TEMPERATURE: 97.8 F

## 2025-06-16 DIAGNOSIS — M22.2X2 PATELLOFEMORAL SYNDROME OF LEFT KNEE: ICD-10-CM

## 2025-06-16 DIAGNOSIS — Z00.00 ANNUAL PHYSICAL EXAM: Primary | ICD-10-CM

## 2025-06-16 DIAGNOSIS — Z11.4 SCREENING FOR HIV (HUMAN IMMUNODEFICIENCY VIRUS): ICD-10-CM

## 2025-06-16 DIAGNOSIS — Z12.4 CERVICAL CANCER SCREENING: ICD-10-CM

## 2025-06-16 DIAGNOSIS — Z11.59 NEED FOR HEPATITIS C SCREENING TEST: ICD-10-CM

## 2025-06-16 PROCEDURE — 99395 PREV VISIT EST AGE 18-39: CPT

## 2025-06-16 NOTE — PATIENT INSTRUCTIONS
"Patient Education     Routine physical for adults   The Basics   Written by the doctors and editors at [facility]   What is a physical? -- A physical is a routine visit, or \"check-up,\" with your doctor. You might also hear it called a \"wellness visit\" or \"preventive visit.\"  During each visit, the doctor will:   Ask about your physical and mental health   Ask about your habits, behaviors, and lifestyle   Do an exam   Give you vaccines if needed   Talk to you about any medicines you take   Give advice about your health   Answer your questions  Getting regular check-ups is an important part of taking care of your health. It can help your doctor find and treat any problems you have. But it's also important for preventing health problems.  A routine physical is different from a \"sick visit.\" A sick visit is when you see a doctor because of a health concern or problem. Since physicals are scheduled ahead of time, you can think about what you want to ask the doctor.  How often should I get a physical? -- It depends on your age and health. In general, for people age 21 years and older:   If you are younger than 50 years, you might be able to get a physical every 3 years.   If you are 50 years or older, your doctor might recommend a physical every year.  If you have an ongoing health condition, like diabetes or high blood pressure, your doctor will probably want to see you more often.  What happens during a physical? -- In general, each visit will include:   Physical exam - The doctor or nurse will check your height, weight, heart rate, and blood pressure. They will also look at your eyes and ears. They will ask about how you are feeling and whether you have any symptoms that bother you.   Medicines - It's a good idea to bring a list of all the medicines you take to each doctor visit. Your doctor will talk to you about your medicines and answer any questions. Tell them if you are having any side effects that bother you. You " "should also tell them if you are having trouble paying for any of your medicines.   Habits and behaviors - This includes:   Your diet   Your exercise habits   Whether you smoke, drink alcohol, or use drugs   Whether you are sexually active   Whether you feel safe at home  Your doctor will talk to you about things you can do to improve your health and lower your risk of health problems. They will also offer help and support. For example, if you want to quit smoking, they can give you advice and might prescribe medicines. If you want to improve your diet or get more physical activity, they can help you with this, too.   Lab tests, if needed - The tests you get will depend on your age and situation. For example, your doctor might want to check your:   Cholesterol   Blood sugar   Iron level   Vaccines - The recommended vaccines will depend on your age, health, and what vaccines you already had. Vaccines are very important because they can prevent certain serious or deadly infections.   Discussion of screening - \"Screening\" means checking for diseases or other health problems before they cause symptoms. Your doctor can recommend screening based on your age, risk, and preferences. This might include tests to check for:   Cancer, such as breast, prostate, cervical, ovarian, colorectal, prostate, lung, or skin cancer   Sexually transmitted infections, such as chlamydia and gonorrhea   Mental health conditions like depression and anxiety  Your doctor will talk to you about the different types of screening tests. They can help you decide which screenings to have. They can also explain what the results might mean.   Answering questions - The physical is a good time to ask the doctor or nurse questions about your health. If needed, they can refer you to other doctors or specialists, too.  Adults older than 65 years often need other care, too. As you get older, your doctor will talk to you about:   How to prevent falling at " home   Hearing or vision tests   Memory testing   How to take your medicines safely   Making sure that you have the help and support you need at home  All topics are updated as new evidence becomes available and our peer review process is complete.  This topic retrieved from [employer] on: May 02, 2024.  Topic 477542 Version 1.0  Release: 32.4.3 - C32.122  © 2024 UpToDate, Inc. and/or its affiliates. All rights reserved.  Consumer Information Use and Disclaimer   Disclaimer: This generalized information is a limited summary of diagnosis, treatment, and/or medication information. It is not meant to be comprehensive and should be used as a tool to help the user understand and/or assess potential diagnostic and treatment options. It does NOT include all information about conditions, treatments, medications, side effects, or risks that may apply to a specific patient. It is not intended to be medical advice or a substitute for the medical advice, diagnosis, or treatment of a health care provider based on the health care provider's examination and assessment of a patient's specific and unique circumstances. Patients must speak with a health care provider for complete information about their health, medical questions, and treatment options, including any risks or benefits regarding use of medications. This information does not endorse any treatments or medications as safe, effective, or approved for treating a specific patient. UpToDate, Inc. and its affiliates disclaim any warranty or liability relating to this information or the use thereof.The use of this information is governed by the Terms of Use, available at https://www.[employer]uwer.com/en/know/clinical-effectiveness-terms. 2024© UpToDate, Inc. and its affiliates and/or licensors. All rights reserved.  Copyright   © 2024 UpToDate, Inc. and/or its affiliates. All rights reserved.

## 2025-06-16 NOTE — PROGRESS NOTES
Adult Annual Physical  Name: Samantha Cruz      : 2005      MRN: [id number]  Encounter Provider: Rochelle Hines PA-C  Encounter Date: 2025   Encounter department: [facility]    :  Assessment & Plan  Annual physical exam  Physical exam unremarkable. BP WNL.   Has active orders for routine labs -- advised to get completed  Agreeable to HIV/Hep C screening -- ordered  Up to date on TDAP --   Due for cervical cancer screening -- GYN referral placed to schedule  Follow up in one year for next annual physical. Sooner as needed.        Cervical cancer screening    Orders:    Ambulatory Referral to Obstetrics / Gynecology; Future    Patellofemoral syndrome of left knee  Saw ortho who recommended PT, pt reports she never had the time but is interested now  Therefore new PT referral placed    Orders:    Ambulatory Referral to Physical Therapy; Future    Screening for HIV (human immunodeficiency virus)    Orders:    HIV 1/2 AG/AB w Reflex [facility] for 2 yr old and above; Future    Need for hepatitis C screening test    Orders:    Hepatitis C Antibody; Future        Preventive Screenings:  - Diabetes Screening: screening up-to-date  - Hepatitis C screening: patient agrees to screening and orders placed   - HIV screening: orders placed and patient agrees to screening   - Cervical cancer screening: orders placed   - Lung cancer screening: screening not indicated     Counseling/Anticipatory Guidance:    - Diet: discussed recommendations for a healthy/well-balanced diet.   - Exercise: the importance of regular exercise/physical activity was discussed. Recommend exercise 3-5 times per week for at least 30 minutes.          History of Present Illness       Patient presents for annual physical  She has no acute concerns  Reports she is doing well    Adult Annual Physical:  Patient presents for annual physical.     Diet and Physical Activity:  - Diet/Nutrition: poor diet.  - Exercise: no formal  "exercise.    Depression Screening:  - PHQ-2 Score: 0    General Health:  - Sleep: 4-6 hours of sleep on average and sleeps well.  - Hearing: normal hearing bilateral ears.  - Vision: no vision problems.  - Dental: regular dental visits, brushes teeth twice daily and does not floss.    /GYN Health:  - Follows with GYN: no.   - Menopause: premenopausal.   - History of STDs: no    Review of Systems   Respiratory:  Negative for chest tightness and shortness of breath.    Cardiovascular:  Negative for chest pain and palpitations.   Gastrointestinal:  Negative for abdominal pain, constipation, diarrhea, nausea and vomiting.   Musculoskeletal:  Positive for arthralgias.   Neurological:  Negative for dizziness and headaches.     Medical History Reviewed by provider this encounter:     .  Past Medical History   Past Medical History[1]  Past Surgical History[2]  Family History[3]   reports that she has never smoked. She has never used smokeless tobacco. She reports current alcohol use. She reports that she does not use drugs.  Current Outpatient Medications   Medication Instructions    ferrous sulfate 324 mg, Oral, Daily before breakfast    naproxen (NAPROSYN) 500 mg, Oral, 2 times daily with meals    Vitamin D (Ergocalciferol) 50,000 Units, Oral, Weekly   Allergies[4]   Medications Ordered Prior to Encounter[5]   Social History[6]    Objective   /70   Pulse 96   Temp 97.8 °F (36.6 °C)   Ht 5' 6\" (1.676 m)   Wt 91.6 kg (202 lb)   SpO2 98%   BMI 32.60 kg/m²     Physical Exam  Vitals reviewed.   Constitutional:       General: She is not in acute distress.     Appearance: Normal appearance. She is not ill-appearing or diaphoretic.   HENT:      Head: Normocephalic and atraumatic.      Right Ear: Tympanic membrane, ear canal and external ear normal. There is no impacted cerumen.      Left Ear: Tympanic membrane, ear canal and external ear normal. There is no impacted cerumen.      Nose: Nose normal.      " Mouth/Throat:      Mouth: Mucous membranes are moist.      Pharynx: Oropharynx is clear.     Eyes:      General:         Right eye: No discharge.         Left eye: No discharge.      Conjunctiva/sclera: Conjunctivae normal.       Cardiovascular:      Rate and Rhythm: Normal rate and regular rhythm.      Heart sounds: Normal heart sounds. No murmur heard.  Pulmonary:      Effort: Pulmonary effort is normal. No respiratory distress.      Breath sounds: Normal breath sounds. No wheezing, rhonchi or rales.   Abdominal:      General: There is no distension.      Palpations: Abdomen is soft.      Tenderness: There is no abdominal tenderness.     Musculoskeletal:      Cervical back: Neck supple.      Right lower leg: No edema.      Left lower leg: No edema.   Lymphadenopathy:      Cervical: No cervical adenopathy.     Skin:     General: Skin is warm.     Neurological:      General: No focal deficit present.      Mental Status: She is alert.      Gait: Gait normal.     Psychiatric:         Mood and Affect: Mood normal.              [1]   Past Medical History:  Diagnosis Date    Acne    [2]   Past Surgical History:  Procedure Laterality Date    KNEE SURGERY Left    [3]   Family History  Problem Relation Name Age of Onset    Ulcerative colitis Mother      Hypertension Father      Heart failure Father     [4] No Known Allergies  [5]   Current Outpatient Medications on File Prior to Visit   Medication Sig Dispense Refill    ferrous sulfate 324 (65 Fe) mg TAKE 1 TABLET (324 MG TOTAL) BY MOUTH DAILY BEFORE BREAKFAST 30 tablet 5    naproxen (Naprosyn) 500 mg tablet Take 1 tablet (500 mg total) by mouth 2 (two) times a day with meals 30 tablet 0    Vitamin D, Ergocalciferol, 31107 units CAPS Take 50,000 Units by mouth once a week 12 capsule 0     No current facility-administered medications on file prior to visit.   [6]   Social History  Tobacco Use    Smoking status: Never    Smokeless tobacco: Never   Vaping Use    Vaping status:  Never Used   Substance and Sexual Activity    Alcohol use: Yes    Drug use: Never    Sexual activity: Never

## 2025-06-16 NOTE — ASSESSMENT & PLAN NOTE
Saw ortho who recommended PT, pt reports she never had the time but is interested now  Therefore new PT referral placed    Orders:    Ambulatory Referral to Physical Therapy; Future

## 2025-07-28 ENCOUNTER — APPOINTMENT (OUTPATIENT)
Dept: LAB | Facility: CLINIC | Age: 20
End: 2025-07-28
Payer: COMMERCIAL

## 2025-07-28 DIAGNOSIS — Z11.4 SCREENING FOR HIV (HUMAN IMMUNODEFICIENCY VIRUS): ICD-10-CM

## 2025-07-28 DIAGNOSIS — E61.1 IRON DEFICIENCY: ICD-10-CM

## 2025-07-28 DIAGNOSIS — Z00.00 HEALTHCARE MAINTENANCE: ICD-10-CM

## 2025-07-28 DIAGNOSIS — Z11.59 NEED FOR HEPATITIS C SCREENING TEST: ICD-10-CM

## 2025-07-28 DIAGNOSIS — E55.9 VITAMIN D DEFICIENCY: ICD-10-CM

## 2025-07-28 DIAGNOSIS — Z13.220 LIPID SCREENING: ICD-10-CM

## 2025-07-28 LAB
ALBUMIN SERPL BCG-MCNC: 4.2 G/DL (ref 3.5–5)
ALP SERPL-CCNC: 71 U/L (ref 34–104)
ALT SERPL W P-5'-P-CCNC: 24 U/L (ref 7–52)
ANION GAP SERPL CALCULATED.3IONS-SCNC: 8 MMOL/L (ref 4–13)
AST SERPL W P-5'-P-CCNC: 16 U/L (ref 13–39)
BASOPHILS # BLD AUTO: 0.06 THOUSANDS/ÂΜL (ref 0–0.1)
BASOPHILS NFR BLD AUTO: 1 % (ref 0–1)
BILIRUB SERPL-MCNC: 0.72 MG/DL (ref 0.2–1)
BUN SERPL-MCNC: 8 MG/DL (ref 5–25)
CALCIUM SERPL-MCNC: 9.2 MG/DL (ref 8.4–10.2)
CHLORIDE SERPL-SCNC: 106 MMOL/L (ref 96–108)
CHOLEST SERPL-MCNC: 179 MG/DL (ref ?–200)
CO2 SERPL-SCNC: 24 MMOL/L (ref 21–32)
CREAT SERPL-MCNC: 0.58 MG/DL (ref 0.6–1.3)
EOSINOPHIL # BLD AUTO: 0.19 THOUSAND/ÂΜL (ref 0–0.61)
EOSINOPHIL NFR BLD AUTO: 2 % (ref 0–6)
ERYTHROCYTE [DISTWIDTH] IN BLOOD BY AUTOMATED COUNT: 12.1 % (ref 11.6–15.1)
GFR SERPL CREATININE-BSD FRML MDRD: 132 ML/MIN/1.73SQ M
GLUCOSE P FAST SERPL-MCNC: 82 MG/DL (ref 65–99)
HCT VFR BLD AUTO: 42.1 % (ref 34.8–46.1)
HDLC SERPL-MCNC: 40 MG/DL
HGB BLD-MCNC: 14.5 G/DL (ref 11.5–15.4)
IMM GRANULOCYTES # BLD AUTO: 0.04 THOUSAND/UL (ref 0–0.2)
IMM GRANULOCYTES NFR BLD AUTO: 0 % (ref 0–2)
IRON SATN MFR SERPL: 17 % (ref 15–50)
IRON SERPL-MCNC: 75 UG/DL (ref 50–212)
LDLC SERPL CALC-MCNC: 115 MG/DL (ref 0–100)
LYMPHOCYTES # BLD AUTO: 3.35 THOUSANDS/ÂΜL (ref 0.6–4.47)
LYMPHOCYTES NFR BLD AUTO: 37 % (ref 14–44)
MCH RBC QN AUTO: 30.1 PG (ref 26.8–34.3)
MCHC RBC AUTO-ENTMCNC: 34.4 G/DL (ref 31.4–37.4)
MCV RBC AUTO: 88 FL (ref 82–98)
MONOCYTES # BLD AUTO: 0.61 THOUSAND/ÂΜL (ref 0.17–1.22)
MONOCYTES NFR BLD AUTO: 7 % (ref 4–12)
NEUTROPHILS # BLD AUTO: 4.74 THOUSANDS/ÂΜL (ref 1.85–7.62)
NEUTS SEG NFR BLD AUTO: 53 % (ref 43–75)
NONHDLC SERPL-MCNC: 139 MG/DL
NRBC BLD AUTO-RTO: 0 /100 WBCS
PLATELET # BLD AUTO: 247 THOUSANDS/UL (ref 149–390)
PMV BLD AUTO: 10.7 FL (ref 8.9–12.7)
POTASSIUM SERPL-SCNC: 3.9 MMOL/L (ref 3.5–5.3)
PROT SERPL-MCNC: 6.8 G/DL (ref 6.4–8.4)
RBC # BLD AUTO: 4.81 MILLION/UL (ref 3.81–5.12)
SODIUM SERPL-SCNC: 138 MMOL/L (ref 135–147)
TIBC SERPL-MCNC: 442.4 UG/DL (ref 250–450)
TRANSFERRIN SERPL-MCNC: 316 MG/DL (ref 203–362)
TRIGL SERPL-MCNC: 121 MG/DL (ref ?–150)
UIBC SERPL-MCNC: 367 UG/DL (ref 155–355)
WBC # BLD AUTO: 8.99 THOUSAND/UL (ref 4.31–10.16)

## 2025-07-28 PROCEDURE — 83550 IRON BINDING TEST: CPT

## 2025-07-28 PROCEDURE — 83540 ASSAY OF IRON: CPT

## 2025-07-28 PROCEDURE — 85025 COMPLETE CBC W/AUTO DIFF WBC: CPT

## 2025-07-28 PROCEDURE — 36415 COLL VENOUS BLD VENIPUNCTURE: CPT

## 2025-07-28 PROCEDURE — 86803 HEPATITIS C AB TEST: CPT

## 2025-07-28 PROCEDURE — 80053 COMPREHEN METABOLIC PANEL: CPT

## 2025-07-28 PROCEDURE — 87389 HIV-1 AG W/HIV-1&-2 AB AG IA: CPT

## 2025-07-28 PROCEDURE — 82728 ASSAY OF FERRITIN: CPT

## 2025-07-28 PROCEDURE — 80061 LIPID PANEL: CPT

## 2025-07-28 PROCEDURE — 82306 VITAMIN D 25 HYDROXY: CPT

## 2025-07-29 DIAGNOSIS — E55.9 VITAMIN D DEFICIENCY: Primary | ICD-10-CM

## 2025-07-29 LAB
25(OH)D3 SERPL-MCNC: 20.5 NG/ML (ref 30–100)
FERRITIN SERPL-MCNC: 15 NG/ML (ref 30–307)
HCV AB SER QL: NORMAL
HIV 1+2 AB+HIV1 P24 AG SERPL QL IA: NORMAL

## 2025-07-29 RX ORDER — ERGOCALCIFEROL 1.25 MG/1
50000 CAPSULE ORAL WEEKLY
Qty: 12 CAPSULE | Refills: 0 | Status: SHIPPED | OUTPATIENT
Start: 2025-07-29